# Patient Record
Sex: MALE | Race: WHITE | Employment: FULL TIME | URBAN - METROPOLITAN AREA
[De-identification: names, ages, dates, MRNs, and addresses within clinical notes are randomized per-mention and may not be internally consistent; named-entity substitution may affect disease eponyms.]

---

## 2017-04-13 ENCOUNTER — CLINICAL SUPPORT (OUTPATIENT)
Dept: CARDIOLOGY CLINIC | Age: 42
End: 2017-04-13

## 2017-04-13 DIAGNOSIS — Z02.89 EXAMINATION, PHYSICAL, EMPLOYEE: Primary | ICD-10-CM

## 2017-04-13 NOTE — PROGRESS NOTES
See scanned report. Dioni NORRIS ordered study and Dr. Seven Kaufman read study. ID verified per protocol.

## 2017-05-15 ENCOUNTER — TELEPHONE (OUTPATIENT)
Dept: CARDIOLOGY CLINIC | Age: 42
End: 2017-05-15

## 2018-04-12 ENCOUNTER — HOSPITAL ENCOUNTER (OUTPATIENT)
Dept: CT IMAGING | Age: 43
Discharge: HOME | End: 2018-04-12
Attending: FAMILY MEDICINE

## 2018-04-12 DIAGNOSIS — Z13.6 SCREENING FOR CARDIOVASCULAR CONDITION: ICD-10-CM

## 2018-04-12 PROCEDURE — 75571 CT HRT W/O DYE W/CA TEST: CPT

## 2018-04-12 NOTE — CARDIO/PULMONARY
Cardiac Rehab: Spoke with patient about Calcium Scoring results (0). Discussed significance of results, and CAD risk factors. Pt reported that he was being screened because it was required for his job as a [employer] . Has stress test last year. No family hx CAD. He denied HTN, high cholesterol, DM, and smoking. Encouraged heart healthy/low sodium diet and exercise. Pt reported he maintains a healthy body weight and runs about 70 miles per week. Pt indicated he would continue to f/u with his PCP. Copy of report sent to patient.